# Patient Record
Sex: FEMALE
[De-identification: names, ages, dates, MRNs, and addresses within clinical notes are randomized per-mention and may not be internally consistent; named-entity substitution may affect disease eponyms.]

---

## 2018-11-27 ENCOUNTER — APPOINTMENT (OUTPATIENT)
Dept: ORTHOPEDIC SURGERY | Facility: CLINIC | Age: 33
End: 2018-11-27
Payer: MEDICAID

## 2018-11-27 VITALS
WEIGHT: 110 LBS | DIASTOLIC BLOOD PRESSURE: 117 MMHG | BODY MASS INDEX: 22.18 KG/M2 | SYSTOLIC BLOOD PRESSURE: 158 MMHG | HEIGHT: 59 IN | HEART RATE: 77 BPM

## 2018-11-27 DIAGNOSIS — Z78.9 OTHER SPECIFIED HEALTH STATUS: ICD-10-CM

## 2018-11-27 DIAGNOSIS — S52.124A NONDISPLACED FRACTURE OF HEAD OF RIGHT RADIUS, INITIAL ENCOUNTER FOR CLOSED FRACTURE: ICD-10-CM

## 2018-11-27 DIAGNOSIS — Z80.0 FAMILY HISTORY OF MALIGNANT NEOPLASM OF DIGESTIVE ORGANS: ICD-10-CM

## 2018-11-27 DIAGNOSIS — Z87.39 PERSONAL HISTORY OF OTHER DISEASES OF THE MUSCULOSKELETAL SYSTEM AND CONNECTIVE TISSUE: ICD-10-CM

## 2018-11-27 PROBLEM — Z00.00 ENCOUNTER FOR PREVENTIVE HEALTH EXAMINATION: Status: ACTIVE | Noted: 2018-11-27

## 2018-11-27 PROCEDURE — 99203 OFFICE O/P NEW LOW 30 MIN: CPT

## 2018-12-03 ENCOUNTER — APPOINTMENT (OUTPATIENT)
Dept: ORTHOPEDIC SURGERY | Facility: CLINIC | Age: 33
End: 2018-12-03
Payer: MEDICAID

## 2018-12-03 VITALS
HEART RATE: 84 BPM | DIASTOLIC BLOOD PRESSURE: 82 MMHG | WEIGHT: 101 LBS | SYSTOLIC BLOOD PRESSURE: 138 MMHG | HEIGHT: 59 IN | BODY MASS INDEX: 20.36 KG/M2

## 2018-12-03 PROCEDURE — 99204 OFFICE O/P NEW MOD 45 MIN: CPT

## 2018-12-03 PROCEDURE — 73080 X-RAY EXAM OF ELBOW: CPT | Mod: RT

## 2018-12-27 ENCOUNTER — APPOINTMENT (OUTPATIENT)
Dept: ORTHOPEDIC SURGERY | Facility: CLINIC | Age: 33
End: 2018-12-27

## 2019-01-08 ENCOUNTER — APPOINTMENT (OUTPATIENT)
Dept: ORTHOPEDIC SURGERY | Facility: CLINIC | Age: 34
End: 2019-01-08
Payer: MEDICAID

## 2019-01-08 VITALS
DIASTOLIC BLOOD PRESSURE: 91 MMHG | HEIGHT: 59 IN | BODY MASS INDEX: 20.36 KG/M2 | SYSTOLIC BLOOD PRESSURE: 132 MMHG | WEIGHT: 101 LBS | HEART RATE: 69 BPM

## 2019-01-08 DIAGNOSIS — S52.124D NONDISPLACED FRACTURE OF HEAD OF RIGHT RADIUS, SUBSEQUENT ENCOUNTER FOR CLOSED FRACTURE WITH ROUTINE HEALING: ICD-10-CM

## 2019-01-08 PROCEDURE — 99213 OFFICE O/P EST LOW 20 MIN: CPT

## 2019-01-08 PROCEDURE — 73080 X-RAY EXAM OF ELBOW: CPT | Mod: RT

## 2019-01-08 NOTE — PHYSICAL EXAM
[Poor Appearance] : well-appearing [Acute Distress] : not in acute distress [Obese] : not obese [Normal] : normal [UE] : Sensory: Intact in bilateral upper extremities [Bicep] : biceps 2+ and symmetric bilaterally [B.R.] : biceps 2+ and symmetric bilaterally [Tricep] : triceps 2+ and symmetric bilaterally [Rad] : radial 2+ and symmetric bilaterally [FreeTextEntry2] : The patient has no respiratory distress. Mood and affect are normal. The patient is alert and oriented to person, place and time.\par There is no pain with active or passive motion of the shoulders. Examination of the right elbow demonstrates no swelling or deformity. There is no tenderness  of the right elbow. There is no instability. There is no pain with range of motion. She has elbow motion 0-130° bilaterally. There is no pain with wrist motion. The wrist is nontender. The skin is intact. There is no lymphedema. [de-identified] : AP, lateral and oblique x-rays of the right elbow demonstrate evidence of healing fracture of the right radial neck

## 2019-01-08 NOTE — HISTORY OF PRESENT ILLNESS
[All Other ROS Normal] : All other review of systems are negative except as noted [Chest Pain] : chest pain [All Hx] : past medical, family, and social [All] : medication and allergy [FreeTextEntry1] : right elbow fracture [FreeTextEntry2] : 33 year old RHD female presents 6 week following right radial head fracture. She reports overall improvement in pain and ROM. She has no complaints.

## 2019-01-08 NOTE — DISCUSSION/SUMMARY
[de-identified] : The patient's right radial neck fracture has healed. She may continue normal activities. She will return as needed.